# Patient Record
Sex: MALE | Race: WHITE
[De-identification: names, ages, dates, MRNs, and addresses within clinical notes are randomized per-mention and may not be internally consistent; named-entity substitution may affect disease eponyms.]

---

## 2018-04-03 ENCOUNTER — HOSPITAL ENCOUNTER (EMERGENCY)
Dept: HOSPITAL 56 - MW.ED | Age: 28
Discharge: OTHER INSTITUTION | End: 2018-04-03
Payer: COMMERCIAL

## 2018-04-03 DIAGNOSIS — T15.91XA: Primary | ICD-10-CM

## 2018-04-03 DIAGNOSIS — F17.210: ICD-10-CM

## 2018-04-06 NOTE — EDM.PDOC
ED HPI GENERAL MEDICAL PROBLEM





- General


Chief Complaint: Chemical Exposure


Stated Complaint: CHEMICAL IN RT EYE


Time Seen by Provider: 04/03/18 13:48


Source of Information: Reports: Patient


History Limitations: Reports: No Limitations





- History of Present Illness


INITIAL COMMENTS - FREE TEXT/NARRATIVE: 





HISTORY AND PHYSICAL:





History of present illness:


[Pt is brought to the ER for evaluation after getting concrete powder in his 

eyes. Occurred 90 mins prior to ER arrival. Has not flushed his eyes. Brought 

to ER by a co-worker. ]





Review of systems: 


As per history of present illness and below otherwise all systems reviewed and 

negative.





Past medical history: 


As per history of present illness and as reviewed below otherwise 

noncontributory.





Surgical history: 


As per history of present illness and as reviewed below otherwise 

noncontributory.





Social history: 


No reported history of drug or alcohol abuse.





Family history: 


As per history of present illness and as reviewed below otherwise 

noncontributory.





Physical exam:


HEENT: Atraumatic, normocephalic, pupils reactive, negative for conjunctival 

pallor or scleral icterus, mucous membranes moist, throat clear, neck supple, 

nontender, trachea midline.


Lungs: Clear to auscultation, breath sounds equal bilaterally, chest nontender.


Heart: S1S2, regular, negative for clicks, rubs, or JVD.


Abdomen: Soft, nondistended, nontender. Negative for masses or 

hepatosplenomegaly. Negative for costovertebral tenderness.


Pelvis: Stable nontender.


Genitourinary: Deferred.


Rectal: Deferred.


Extremities: Atraumatic, negative for cords or calf pain. Neurovascular 

unremarkable.


Neuro: Awake, alert, oriented. Cranial nerves II through XII unremarkable. 

Cerebellum unremarkable. Motor and sensory unremarkable throughout. Exam 

nonfocal.





Diagnostics:


[]





Therapeutics:


[]





Impression: 


[]





Plan:


[Poison control is contacted. Ophthamology is contacted. Dr. Zurita's nurse 

discusses with this NP that patient should come over immediately for evaluation 

as  has an opening in his schedule. Patient is discharged from ER 

immediately w/ instructions to follow up at [facility].]





Definitive disposition and diagnosis as appropriate pending reevaluation and 

review of above.





  ** Right Eye


Pain Score (Numeric/FACES): 4





- Related Data


 Allergies











Allergy/AdvReac Type Severity Reaction Status Date / Time


 


No Known Allergies Allergy   Verified 04/03/18 13:57











Home Meds: 


 Home Meds





. [No Known Home Meds]  04/03/18 [History]











Past Medical History


Respiratory History: Reports: Asthma





- Infectious Disease History


Infectious Disease History: Reports: Chicken Pox





- Past Surgical History


HEENT Surgical History: Reports: Other (See Below)


Other HEENT Surgeries/Procedures: facial surgery





Social & Family History





- Family History


Family Medical History: Noncontributory





- Tobacco Use


Smoking Status *Q: Current Every Day Smoker


Years of Tobacco use: 15


Packs/Tins Daily: 1.5





- Recreational Drug Use


Recreational Drug Use: No





ED ROS GENERAL





- Review of Systems


Review Of Systems: ROS reveals no pertinent complaints other than HPI.





ED EXAM, BURN/SMOKE INHALATION





- Physical Exam


Exam: See Below





Course





- Vital Signs


Last Recorded V/S: 





 Last Vital Signs











Temp  97.3 F   04/03/18 13:48


 


Pulse  68   04/03/18 13:48


 


Resp  18   04/03/18 13:48


 


BP  194/77 H  04/03/18 13:48


 


Pulse Ox  98   04/03/18 13:48














Departure





- Departure


Time of Disposition: 13:48


Disposition: DC/Tfer to Other 70


Clinical Impression: 


 Foreign body in eye








- Discharge Information


Instructions:  Eye Foreign Body, Easy-to-Read, Chemical Conjunctivitis, Easy-to-

Read


Referrals: 


[facility] [Outside]


PCP,None [Primary Care Provider] - 


Forms:  ED Department Discharge